# Patient Record
Sex: MALE | Race: WHITE | URBAN - METROPOLITAN AREA
[De-identification: names, ages, dates, MRNs, and addresses within clinical notes are randomized per-mention and may not be internally consistent; named-entity substitution may affect disease eponyms.]

---

## 2018-04-23 ENCOUNTER — OFFICE VISIT (OUTPATIENT)
Dept: CARDIOLOGY CLINIC | Age: 43
End: 2018-04-23

## 2018-04-23 VITALS
HEART RATE: 73 BPM | DIASTOLIC BLOOD PRESSURE: 91 MMHG | BODY MASS INDEX: 40.04 KG/M2 | WEIGHT: 226 LBS | SYSTOLIC BLOOD PRESSURE: 168 MMHG | HEIGHT: 63 IN

## 2018-04-23 DIAGNOSIS — G47.33 OBSTRUCTIVE SLEEP APNEA SYNDROME: ICD-10-CM

## 2018-04-23 DIAGNOSIS — R94.31 ABNORMAL EKG: ICD-10-CM

## 2018-04-23 DIAGNOSIS — I10 ESSENTIAL HYPERTENSION: Primary | ICD-10-CM

## 2018-04-23 RX ORDER — AMLODIPINE BESYLATE 10 MG/1
10 TABLET ORAL DAILY
Qty: 30 TAB | Refills: 6 | Status: SHIPPED | OUTPATIENT
Start: 2018-04-23

## 2018-04-23 NOTE — LETTER
Fe Kevin 1975 4/23/2018 Dear Bard Verona MD 
 
I had the pleasure of evaluating  Mr. Price Crouch in office today. Below are the relevant portions of my assessment and plan of care. ICD-10-CM ICD-9-CM 1. Essential hypertension I10 401.9 AMB POC EKG ROUTINE W/ 12 LEADS, INTER & REP 2. Abnormal EKG R94.31 794.31 2D ECHO COMPLETE ADULT (TTE) 3. Obstructive sleep apnea syndrome G47.33 327.23 Current Outpatient Prescriptions Medication Sig Dispense Refill  amLODIPine (NORVASC) 10 mg tablet Take 1 Tab by mouth daily. 30 Tab 6 Orders Placed This Encounter  2D ECHO COMPLETE ADULT (TTE) Standing Status:   Future Standing Expiration Date:   10/23/2018 Order Specific Question:   Reason for Exam: Answer:   htn  AMB POC EKG ROUTINE W/ 12 LEADS, INTER & REP Order Specific Question:   Reason for Exam: Answer:   hypertension  amLODIPine (NORVASC) 10 mg tablet Sig: Take 1 Tab by mouth daily. Dispense:  30 Tab Refill:  6 If you have questions, please do not hesitate to call me. I look forward to following Mr. Price Crouch along with you.  
 
Sincerely, 
Jayson Tariq MD

## 2018-04-23 NOTE — LETTER
NOTIFICATION RETURN TO WORK / SCHOOL 
 
4/23/2018 1:40 PM 
 
Mr. Ryley Lundberg 
[address] [address] To Whom It May Concern: 
 
Ryley Lundberg is currently under the care of [address] [place] [address],[address]. He will return to work/school on: 4/24/18 with no restrictions. If there are questions or concerns please have the patient contact our office.  
 
 
 
Sincerely, 
 
 
 
 
MD Jacklyn Mcgregor MA.

## 2018-04-23 NOTE — MR AVS SNAPSHOT
[address] [facility] [address] 
 
 
 [address], [address] [facility] [address] 
[phone] Patient: Calista Becerril MRN: [id number] LPP:[birth year] Visit Information Date & Time Provider Department Dept. Phone Encounter #  
 4/23/2018 12:45 PM Walter Henderson MD [facility] [address] [place] [address] [id number] Follow-up Instructions Return in about 2 months (around 6/23/2018). Your Appointments 6/8/2018  1:30 PM  
PROCEDURE with [place] ECHO [facility] [place] ([address]) Appt Note: yamilet [address], [address] [facility] [address]  
[address], [address] [place] [facility] [address]  
  
    
 6/28/2018  3:30 PM  
Office Visit with Walter Henderson MD  
[facility]) Appt Note: 2 months post echo [facility], [address] [facility] [address]  
[address], [address] [place] [address] Viviane Crenshaw Upcoming Health Maintenance Date Due DTaP/Tdap/Td series (1 - Tdap) 1/4/1998 Influenza Age 5 to Adult 8/1/2017 Allergies as of 4/23/2018  Review Complete On: 4/23/2018 By: Joseph Elizabeth Severity Noted Reaction Type Reactions Codeine  04/23/2018    Itching Current Immunizations  Never Reviewed No immunizations on file. Not reviewed this visit You Were Diagnosed With   
  
 Codes Comments Essential hypertension    -  Primary ICD-10-CM: I10 
ICD-9-CM: 401.9 Abnormal EKG     ICD-10-CM: R94.31 
ICD-9-CM: 794.31 Obstructive sleep apnea syndrome     ICD-10-CM: G47.33 
ICD-9-CM: 327.23 Vitals BP Pulse Height(growth percentile) Weight(growth percentile) BMI Smoking Status (!) 168/91 73 5' 3\" (1.6 m) 226 lb (102.5 kg) 40.03 kg/m2 Never Smoker Vitals History BMI and BSA Data Body Mass Index Body Surface Area 40.03 kg/m 2 2.13 m 2 Preferred Pharmacy  Pharmacy Name Phone [id number] [address] [phone] Your Updated Medication List  
  
   
This list is accurate as of 4/23/18  1:43 PM.  Always use your most recent med list. amLODIPine 10 mg tablet Commonly known as:  Cm Montoya Take 1 Tab by mouth daily. Prescriptions Sent to Pharmacy Refills  
 amLODIPine (NORVASC) 10 mg tablet 6 Sig: Take 1 Tab by mouth daily. Class: Normal  
 Pharmacy: [address] #: [phone] Route: Oral  
  
We Performed the Following AMB POC EKG ROUTINE W/ 12 LEADS, INTER & REP [90126 CPT(R)] Follow-up Instructions Return in about 2 months (around 6/23/2018). To-Do List   
 06/23/2018 Cardiac Services:  2D ECHO COMPLETE ADULT (TTE) Introducing [facility]! Payam Granda introduces [employer] patient portal. Now you can access parts of your medical record, email your doctor's office, and request medication refills online. 1. In your internet browser, go to [email or web address] 2. Click on the First Time User? Click Here link in the Sign In box. You will see the New Member Sign Up page. 3. Enter your [employer] Access Code exactly as it appears below. You will not need to use this code after youve completed the sign-up process. If you do not sign up before the expiration date, you must request a new code. · [employer] Access Code: [email or web address] Expires: 7/22/2018  1:43 PM 
 
4. Enter the last four digits of your Social Security Number (xxxx) and Date of Birth (mm/dd/yyyy) as indicated and click Submit. You will be taken to the next sign-up page. 5. Create a [employer] ID. This will be your [employer] login ID and cannot be changed, so think of one that is secure and easy to remember. 6. Create a [employer] password. You can change your password at any time.  
7. Enter your Password Reset Question and Answer. This can be used at a later time if you forget your password. 8. Enter your e-mail address. You will receive e-mail notification when new information is available in [address] 9. Click Sign Up. You can now view and download portions of your medical record. 10. Click the Download Summary menu link to download a portable copy of your medical information. If you have questions, please visit the Frequently Asked Questions section of the [employer] website. Remember, [employer] is NOT to be used for urgent needs. For medical emergencies, dial 911. Now available from your iPhone and Android! Please provide this summary of care documentation to your next provider. Your primary care clinician is listed as Tasha Vuong. If you have any questions after today's visit, please call [phone].

## 2018-04-25 NOTE — PROGRESS NOTES
HISTORY OF PRESENT ILLNESS  Mitchel Voss is a 41 y.o. male. New Patient   The history is provided by the patient. This is a chronic problem. The problem occurs daily. The problem has not changed since onset. Pertinent negatives include no chest pain, no abdominal pain, no headaches and no shortness of breath. Hypertension   The history is provided by the patient. This is a chronic problem. The problem occurs daily. The problem has been gradually worsening. Pertinent negatives include no chest pain, no abdominal pain, no headaches and no shortness of breath. Review of Systems   Constitutional: Negative for chills, diaphoresis, fever, malaise/fatigue and weight loss. HENT: Negative for congestion, ear discharge, ear pain, hearing loss, nosebleeds and tinnitus. Eyes: Negative for blurred vision. Respiratory: Negative for cough, hemoptysis, sputum production, shortness of breath, wheezing and stridor. Cardiovascular: Negative for chest pain, palpitations, orthopnea, claudication, leg swelling and PND. Gastrointestinal: Negative for abdominal pain, heartburn, nausea and vomiting. Musculoskeletal: Negative for myalgias and neck pain. Skin: Negative for itching and rash. Neurological: Negative for dizziness, tingling, tremors, focal weakness, loss of consciousness, weakness and headaches. Psychiatric/Behavioral: Negative for depression and suicidal ideas. Family History   Problem Relation Age of Onset    Heart Surgery Father     Stroke Father        Past Medical History:   Diagnosis Date    Essential hypertension        History reviewed. No pertinent surgical history.     Social History   Substance Use Topics    Smoking status: Never Smoker    Smokeless tobacco: Never Used    Alcohol use Yes       Allergies   Allergen Reactions    Codeine Itching       Outpatient Prescriptions Marked as Taking for the 4/23/18 encounter (Office Visit) with Britton Boas, MD   Medication Sig Dispense Refill    amLODIPine (NORVASC) 10 mg tablet Take 1 Tab by mouth daily. 30 Tab 6        Visit Vitals    BP (!) 168/91    Pulse 73    Ht 5' 3\" (1.6 m)    Wt 102.5 kg (226 lb)    BMI 40.03 kg/m2     Physical Exam   Constitutional: He is oriented to person, place, and time. He appears well-developed and well-nourished. No distress. HENT:   Head: Atraumatic. Mouth/Throat: No oropharyngeal exudate. Eyes: Conjunctivae are normal. Right eye exhibits no discharge. Left eye exhibits no discharge. No scleral icterus. Neck: Normal range of motion. Neck supple. No JVD present. No tracheal deviation present. No thyromegaly present. Cardiovascular: Normal rate and regular rhythm. Exam reveals no gallop. No murmur heard. Pulmonary/Chest: Effort normal and breath sounds normal. No stridor. He has no wheezes. He has no rales. Abdominal: Soft. There is no tenderness. There is no rebound. Musculoskeletal: Normal range of motion. He exhibits no edema or tenderness. Lymphadenopathy:     He has no cervical adenopathy. Neurological: He is alert and oriented to person, place, and time. He exhibits normal muscle tone. Skin: Skin is warm. He is not diaphoretic. Psychiatric: He has a normal mood and affect. His behavior is normal.     ekg sinus rhythm with no acute st-t changes  ASSESSMENT and PLAN    ICD-10-CM ICD-9-CM    1. Essential hypertension I10 401.9 AMB POC EKG ROUTINE W/ 12 LEADS, INTER & REP   2.  Abnormal EKG R94.31 794.31 2D ECHO COMPLETE ADULT (TTE)   3. Obstructive sleep apnea syndrome G47.33 327.23      Orders Placed This Encounter    2D ECHO COMPLETE ADULT (TTE)     Standing Status:   Future     Standing Expiration Date:   10/23/2018     Order Specific Question:   Reason for Exam:     Answer:   htn    AMB POC EKG ROUTINE W/ 12 LEADS, INTER & REP     Order Specific Question:   Reason for Exam:     Answer:   hypertension    amLODIPine (NORVASC) 10 mg tablet     Sig: Take 1 Tab by mouth daily.     Dispense:  30 Tab     Refill:  6     Follow-up Disposition:  Return in about 2 months (around 6/23/2018). reviewed diet, exercise and weight control. Patient recently admitted to [facility] ER -- found to have high bp- started on norvasc 5mg daily  His BP remains high- will increase norvasc 10mg daily. Will obtain echo to assess HHD. Follow up in 2 months- awaiting sleep study results.